# Patient Record
Sex: MALE | Race: BLACK OR AFRICAN AMERICAN | NOT HISPANIC OR LATINO | Employment: UNEMPLOYED | URBAN - METROPOLITAN AREA
[De-identification: names, ages, dates, MRNs, and addresses within clinical notes are randomized per-mention and may not be internally consistent; named-entity substitution may affect disease eponyms.]

---

## 2023-05-01 ENCOUNTER — APPOINTMENT (EMERGENCY)
Dept: CT IMAGING | Facility: HOSPITAL | Age: 38
End: 2023-05-01

## 2023-05-01 ENCOUNTER — APPOINTMENT (EMERGENCY)
Dept: RADIOLOGY | Facility: HOSPITAL | Age: 38
End: 2023-05-01

## 2023-05-01 ENCOUNTER — HOSPITAL ENCOUNTER (EMERGENCY)
Facility: HOSPITAL | Age: 38
Discharge: HOME | End: 2023-05-01
Attending: EMERGENCY MEDICINE | Admitting: EMERGENCY MEDICINE

## 2023-05-01 VITALS
HEART RATE: 77 BPM | TEMPERATURE: 98 F | DIASTOLIC BLOOD PRESSURE: 94 MMHG | RESPIRATION RATE: 14 BRPM | HEIGHT: 70 IN | WEIGHT: 158 LBS | BODY MASS INDEX: 22.62 KG/M2 | OXYGEN SATURATION: 97 % | SYSTOLIC BLOOD PRESSURE: 147 MMHG

## 2023-05-01 DIAGNOSIS — R10.9 FLANK PAIN: Primary | ICD-10-CM

## 2023-05-01 LAB
ALBUMIN SERPL BCP-MCNC: 4.2 G/DL (ref 3.5–5)
ALP SERPL-CCNC: 56 U/L (ref 34–104)
ALT SERPL W P-5'-P-CCNC: 14 U/L (ref 7–52)
ANION GAP SERPL CALCULATED.3IONS-SCNC: 6 MMOL/L (ref 4–13)
AST SERPL W P-5'-P-CCNC: 14 U/L (ref 13–39)
ATRIAL RATE: 80 BPM
BACTERIA UR QL AUTO: NORMAL /HPF
BASOPHILS # BLD AUTO: 0.05 THOUSANDS/ΜL (ref 0–0.1)
BASOPHILS NFR BLD AUTO: 1 % (ref 0–1)
BILIRUB SERPL-MCNC: 0.73 MG/DL (ref 0.2–1)
BILIRUB UR QL STRIP: NEGATIVE
BUN SERPL-MCNC: 12 MG/DL (ref 5–25)
CALCIUM SERPL-MCNC: 9.4 MG/DL (ref 8.4–10.2)
CARDIAC TROPONIN I PNL SERPL HS: <2 NG/L
CHLORIDE SERPL-SCNC: 104 MMOL/L (ref 96–108)
CLARITY UR: CLEAR
CO2 SERPL-SCNC: 27 MMOL/L (ref 21–32)
COLOR UR: COLORLESS
CREAT SERPL-MCNC: 0.9 MG/DL (ref 0.6–1.3)
D DIMER PPP FEU-MCNC: <0.27 UG/ML FEU
EOSINOPHIL # BLD AUTO: 0.26 THOUSAND/ΜL (ref 0–0.61)
EOSINOPHIL NFR BLD AUTO: 5 % (ref 0–6)
ERYTHROCYTE [DISTWIDTH] IN BLOOD BY AUTOMATED COUNT: 12.8 % (ref 11.6–15.1)
GFR SERPL CREATININE-BSD FRML MDRD: 108 ML/MIN/1.73SQ M
GLUCOSE SERPL-MCNC: 235 MG/DL (ref 65–140)
GLUCOSE UR STRIP-MCNC: ABNORMAL MG/DL
HCT VFR BLD AUTO: 40.8 % (ref 36.5–49.3)
HGB BLD-MCNC: 14.1 G/DL (ref 12–17)
HGB UR QL STRIP.AUTO: NEGATIVE
IMM GRANULOCYTES # BLD AUTO: 0.01 THOUSAND/UL (ref 0–0.2)
IMM GRANULOCYTES NFR BLD AUTO: 0 % (ref 0–2)
KETONES UR STRIP-MCNC: NEGATIVE MG/DL
LEUKOCYTE ESTERASE UR QL STRIP: NEGATIVE
LYMPHOCYTES # BLD AUTO: 1.71 THOUSANDS/ΜL (ref 0.6–4.47)
LYMPHOCYTES NFR BLD AUTO: 36 % (ref 14–44)
MCH RBC QN AUTO: 27.6 PG (ref 26.8–34.3)
MCHC RBC AUTO-ENTMCNC: 34.6 G/DL (ref 31.4–37.4)
MCV RBC AUTO: 80 FL (ref 82–98)
MONOCYTES # BLD AUTO: 0.46 THOUSAND/ΜL (ref 0.17–1.22)
MONOCYTES NFR BLD AUTO: 10 % (ref 4–12)
NEUTROPHILS # BLD AUTO: 2.33 THOUSANDS/ΜL (ref 1.85–7.62)
NEUTS SEG NFR BLD AUTO: 48 % (ref 43–75)
NITRITE UR QL STRIP: NEGATIVE
NON-SQ EPI CELLS URNS QL MICRO: NORMAL /HPF
NRBC BLD AUTO-RTO: 0 /100 WBCS
P AXIS: 72 DEGREES
PH UR STRIP.AUTO: 6 [PH]
PLATELET # BLD AUTO: 265 THOUSANDS/UL (ref 149–390)
PMV BLD AUTO: 10.1 FL (ref 8.9–12.7)
POTASSIUM SERPL-SCNC: 4 MMOL/L (ref 3.5–5.3)
PR INTERVAL: 148 MS
PROT SERPL-MCNC: 6.7 G/DL (ref 6.4–8.4)
PROT UR STRIP-MCNC: NEGATIVE MG/DL
QRS AXIS: 51 DEGREES
QRSD INTERVAL: 96 MS
QT INTERVAL: 370 MS
QTC INTERVAL: 426 MS
RBC # BLD AUTO: 5.1 MILLION/UL (ref 3.88–5.62)
RBC #/AREA URNS AUTO: NORMAL /HPF
SODIUM SERPL-SCNC: 137 MMOL/L (ref 135–147)
SP GR UR STRIP.AUTO: 1.03 (ref 1–1.03)
T WAVE AXIS: 42 DEGREES
UROBILINOGEN UR STRIP-ACNC: <2 MG/DL
VENTRICULAR RATE: 80 BPM
WBC # BLD AUTO: 4.82 THOUSAND/UL (ref 4.31–10.16)
WBC #/AREA URNS AUTO: NORMAL /HPF

## 2023-05-01 RX ORDER — KETOROLAC TROMETHAMINE 30 MG/ML
15 INJECTION, SOLUTION INTRAMUSCULAR; INTRAVENOUS ONCE
Status: COMPLETED | OUTPATIENT
Start: 2023-05-01 | End: 2023-05-01

## 2023-05-01 RX ADMIN — KETOROLAC TROMETHAMINE 15 MG: 30 INJECTION, SOLUTION INTRAMUSCULAR at 16:02

## 2023-05-01 RX ADMIN — MORPHINE SULFATE 2 MG: 2 INJECTION, SOLUTION INTRAMUSCULAR; INTRAVENOUS at 16:02

## 2023-05-01 RX ADMIN — IOHEXOL 100 ML: 350 INJECTION, SOLUTION INTRAVENOUS at 17:09

## 2023-05-01 NOTE — ED NOTES
11/22/2019 - 2.9 Patient transported to [address] [place] [address]  [facility]  05/01/23 8616

## 2023-05-01 NOTE — ED PROVIDER NOTES
History  Chief Complaint   Patient presents with    Abdominal Pain     Pt reports he has left sided abd pain that moves to the side since Easter  Denies N/V/D  Patient is a 40-year-old male past medical history diabetes, hypertension presenting with flank pain  Patient states that for the last month he has had constant left flank pain which radiates into his abdomen and back and is worse with position changes and lying down but states that he did not injure himself  He states mildly relieved with Tylenol Motrin, did not take Motrin today but took Tylenol roughly 2 hours ago  Denies any nausea/vomiting/diarrhea/constipation, shortness of breath, dizziness, fevers, leg pain or swelling, rashes, urinary symptoms  Does note dry cough in same timeframe as well as left-sided chest pain in same timeframe which he also states is worse with lying down  Does not have a PCP  None       Past Medical History:   Diagnosis Date    Diabetes mellitus ([facility] [address] )     Hypertension        History reviewed  No pertinent surgical history  History reviewed  No pertinent family history  I have reviewed and agree with the history as documented  E-Cigarette/Vaping    E-Cigarette Use Never User      E-Cigarette/Vaping Substances     Social History     Tobacco Use    Smoking status: Never    Smokeless tobacco: Never   Vaping Use    Vaping Use: Never used   Substance Use Topics    Alcohol use: Never    Drug use: Never       Review of Systems   All other systems reviewed and are negative  Physical Exam  Physical Exam  Vitals reviewed  Constitutional:       General: He is not in acute distress  Appearance: Normal appearance  He is not ill-appearing  HENT:      Mouth/Throat:      Mouth: Mucous membranes are moist    Eyes:      Conjunctiva/sclera: Conjunctivae normal       Comments: Normal conjunctiva   Cardiovascular:      Rate and Rhythm: Normal rate and regular rhythm        Heart sounds: Normal heart sounds  Comments: Equal radial pulses  Pulmonary:      Effort: Pulmonary effort is normal       Breath sounds: Normal breath sounds  Abdominal:      General: Abdomen is flat  Palpations: Abdomen is soft  Tenderness: There is no abdominal tenderness  There is no right CVA tenderness or left CVA tenderness  Musculoskeletal:         General: No swelling  Normal range of motion  Cervical back: Neck supple  Skin:     General: Skin is warm and dry  Neurological:      General: No focal deficit present  Mental Status: He is alert     Psychiatric:         Mood and Affect: Mood normal          Vital Signs  ED Triage Vitals [05/01/23 1533]   Temperature Pulse Respirations Blood Pressure SpO2   98 °F (36 7 °C) 88 18 (!) 168/102 97 %      Temp Source Heart Rate Source Patient Position - Orthostatic VS BP Location FiO2 (%)   Tympanic Monitor Sitting Left arm --      Pain Score       --           Vitals:    05/01/23 1533 05/01/23 1537   BP: (!) 168/102 163/93   Pulse: 88 87   Patient Position - Orthostatic VS: Sitting          Visual Acuity      ED Medications  Medications   ketorolac (TORADOL) injection 15 mg (has no administration in time range)   morphine injection 2 mg (has no administration in time range)       Diagnostic Studies  Results Reviewed     Procedure Component Value Units Date/Time    Comprehensive metabolic panel [[id number]]     Lab Status: No result Specimen: Blood     CBC and differential [[id number]]     Lab Status: No result Specimen: Blood     D-Dimer [[id number]]     Lab Status: No result Specimen: Blood     HS Troponin 0hr (reflex protocol) [[id number]]     Lab Status: No result Specimen: Blood                  XR chest 2 views    (Results Pending)              Procedures  ECG 12 Lead Documentation Only    Date/Time: 5/1/2023 4:43 PM  Performed by: Kyle Keller DO  Authorized by: Kyle Keller DO     Patient location:  ED  Previous ECG:     Previous ECG: Unavailable  Interpretation:     Interpretation: normal    Rate:     ECG rate assessment: normal    Rhythm:     Rhythm: sinus rhythm    Ectopy:     Ectopy: none    QRS:     QRS axis:  Normal    QRS intervals:  Normal  Conduction:     Conduction: normal    ST segments:     ST segments:  Normal  T waves:     T waves: normal               ED Course  ED Course as of 05/01/23 1929   Mon May 01, 2023   1909 Work-up unremarkable, will discharge with outpatient PCP follow-up  Medical Decision Making  Patient is a 40-year-old male past medical history diabetes, hypertension presenting with flank pain  Patient is well-appearing at bedside mildly hypertensive but with equal pulses, no lower extremity edema, no CVA or abdominal tenderness no other significant physical exam findings  As patient does note associated chest pain will obtain cardiac work-up to rule out ACS, chest x-ray to rule out pneumonia, pneumothorax, D-dimer to rule out pulmonary embolism and pending D-dimer will obtain either CT PE with CT abdomen pelvis or CT abdomen pelvis  Pain control and reassess  Amount and/or Complexity of Data Reviewed  Labs: ordered  Radiology: ordered  Risk  Prescription drug management  Disposition  Final diagnoses:   None     ED Disposition     None      Follow-up Information    None         Patient's Medications    No medications on file       No discharge procedures on file      PDMP Review     None          ED Provider  Electronically Signed by           Jakob Goyal DO  05/01/23 1929

## 2023-05-23 ENCOUNTER — OFFICE VISIT (OUTPATIENT)
Dept: URGENT CARE | Facility: CLINIC | Age: 38
End: 2023-05-23

## 2023-05-23 VITALS
SYSTOLIC BLOOD PRESSURE: 170 MMHG | WEIGHT: 165.4 LBS | RESPIRATION RATE: 18 BRPM | OXYGEN SATURATION: 99 % | HEIGHT: 70 IN | DIASTOLIC BLOOD PRESSURE: 110 MMHG | BODY MASS INDEX: 23.68 KG/M2 | TEMPERATURE: 97.3 F | HEART RATE: 88 BPM

## 2023-05-23 DIAGNOSIS — Z02.4 DRIVER'S PERMIT PE (PHYSICAL EXAMINATION): Primary | ICD-10-CM

## 2023-05-23 NOTE — PROGRESS NOTES
Spoke with provider, learner's permit exam unable to be completed due to uncontrolled hypertension and diagnosis of type 1 diabetes with no recent A1C  Pt also was informed to see opthalmologist for clearance for his pseudophakia and proliferative diabetic retinopathy of right eye